# Patient Record
Sex: FEMALE | URBAN - METROPOLITAN AREA
[De-identification: names, ages, dates, MRNs, and addresses within clinical notes are randomized per-mention and may not be internally consistent; named-entity substitution may affect disease eponyms.]

---

## 2018-06-13 NOTE — PATIENT DISCUSSION
IRIS SYNECHIA OS   ;  IOP'S WNL AND IRIS STRUCTURES APPEAR STABLE. FOLLOW WITH SCHEDULED APPOINTMENTS.

## 2018-06-13 NOTE — PATIENT DISCUSSION
Posterior Synechiae Counseling: Most commonly formed during states of inflammation and cellular proliferation. Synechiae typically has an underlying inflammatory disease process such as uveitis and will present with related symptoms. The pathophysiology is thought to be related to inflammatory cells, fibrin and protein deposition, which stimulates the formation of adhesions between structures. A posterior pushing mechanism can cause apposition of the iris on the trabecular meshwork, which may result in continuous PAS and angle closure. Other causes include trauma and increased intraocular pressure. This has been fully explained to patient and the patient understands the importance of follow up exams.

## 2018-06-13 NOTE — PATIENT DISCUSSION
Iridodialyses Counseling : Iridodialyses is a separation of the iris from the scleral spur usually caused by blunt trauma to the eye, but may also be caused by penetrating eye injuries. Patient understands to call if any change in vision.

## 2018-12-12 NOTE — PATIENT DISCUSSION
Traumatic Glaucoma Counseling:  I have explained to the patient at length regarding the diagnosis of glaucoma its pathophysiology and it's potential for vision loss. I discussed the treatment options to include: medications, laser treatments, and surgery along with the risks and benefits of each. Follow-up as scheduled.

## 2018-12-12 NOTE — PATIENT DISCUSSION
New Prescription: latanoprost (latanoprost): drops: 0.005% 1 drop at bedtime as directed into left eye 12-

## 2018-12-12 NOTE — PATIENT DISCUSSION
TRAUMATIC GLAUCOMA OS: INTRAOCULAR PRESSURE IS NOT WITHIN ACCEPTABLE LIMITS OS WITH ANGLE RECESSION IN OS. START LATANOPROST QHS OS. STRESSED IMPORTANCE OF REGULAR EYE EXAMS AND CONSISTENT DROP USE. RETURN FOR FOLLOW-UP AS SCHEDULED.

## 2019-01-23 NOTE — PATIENT DISCUSSION
TRAUMATIC GLAUCOMA OS: INTRAOCULAR PRESSURE IS NOT WITHIN ACCEPTABLE LIMITS OS WITH ANGLE RECESSION IN OS. CONTINUE GLAUCOMA DROPS AS PRESCRIBED. STRESSED IMPORTANCE OF REGULAR EYE EXAMS AND CONSISTENT DROP USE. RETURN FOR FOLLOW-UP AS SCHEDULED.

## 2019-06-10 NOTE — PATIENT DISCUSSION
Secondary Glaucoma Counseling (Traumatic) OS: Explained to patient. Trauma is usually classified as blunt or penetrating ? both types can lead to secondary glaucoma. The presentation may be immediate at the time of the injury (acute or delayed), or develop over a period of time after the injury (chronic). Most chronic presentations are painless and slow in onset so patients at high risk of glaucoma should have regular lifelong monitoring of their intraocular pressure (IOP). Occasionally a later-onset form may present with sudden onset of pain and photophobia from raised IOP. Explained the importance of life long regular follow up care.

## 2019-06-10 NOTE — PATIENT DISCUSSION
TRAUMATIC GLAUCOMA OS: INTRAOCULAR PRESSURE IS WITHIN ACCEPTABLE LIMITS OS. CONTINUE GLAUCOMA DROPS AS PRESCRIBED. STRESSED IMPORTANCE OF REGULAR EYE EXAMS AND CONSISTENT DROP USE. RETURN FOR FOLLOW-UP AS SCHEDULED.

## 2019-11-04 NOTE — PATIENT DISCUSSION
Secondary Glaucoma Counseling (Traumatic) OS: Explained to patient. Trauma is usually classified as blunt or penetrating both types can lead to secondary glaucoma. The presentation may be immediate at the time of the injury (acute or delayed), or develop over a period of time after the injury (chronic). Most chronic presentations are painless and slow in onset so patients at high risk of glaucoma should have regular lifelong monitoring of their intraocular pressure (IOP). Occasionally a later-onset form may present with sudden onset of pain and photophobia from raised IOP. Explained the importance of life long regular follow up care.

## 2019-11-04 NOTE — PATIENT DISCUSSION
TRAUMATIC GLAUCOMA OS: INTRAOCULAR PRESSURE IS WITHIN ACCEPTABLE LIMITS OS . CONTINUE GLAUCOMA DROPS AS PRESCRIBED. STRESSED IMPORTANCE OF REGULAR EYE EXAMS AND CONSISTENT DROP USE. RETURN FOR FOLLOW-UP AS SCHEDULED.

## 2020-01-10 NOTE — PATIENT DISCUSSION
TRAUMATIC GLC OS: POSSIBLE CHANGE TO FIELD OS- REPEAT 3-6 MONTHS TO CONFIRM. CONTINUE LATANOPROST QHS -STRESSED COMPLIANCE OF EYE DROPS.

## 2020-07-29 ENCOUNTER — IMPORTED ENCOUNTER (OUTPATIENT)
Dept: URBAN - METROPOLITAN AREA CLINIC 9 | Facility: CLINIC | Age: 60
End: 2020-07-29

## 2020-10-07 NOTE — PATIENT DISCUSSION
Epiretinal Membrane Counseling: The diagnosis and natural history of epiretinal membrane was discussed with the patient including the risk of progression with retinal traction resulting in visual distortion. Patient instructed on how to do [id number] to check for distortion in vision, The patient is instructed to call back immediately if any vision changes, and keep follow up as scheduled.

## 2020-10-07 NOTE — PATIENT DISCUSSION
IRIS POSTERIOR SYNECHIA OS ; IOP'S WNL AND IRIS STRUCTURES APPEAR STABLE. FOLLOW WITH SCHEDULED APPOINTMENTS.

## 2020-10-07 NOTE — PATIENT DISCUSSION
TRAUMATIC GLAUCOMA OS: INTRAOCULAR PRESSURE IS WITHIN ACCEPTABLE LIMITS OS WITH STABLE OCT. CONTINUE GLAUCOMA DROPS AS PRESCRIBED. STRESSED IMPORTANCE OF REGULAR EYE EXAMS AND CONSISTENT DROP USE. RETURN FOR FOLLOW-UP AS SCHEDULED.

## 2020-10-07 NOTE — PATIENT DISCUSSION
POSTERIOR VITREOUS DETACHMENT WITH VITREOUS FLOATERS, OU: NO HOLES OR NO TEARS 360' WITH INDIRECT EXAM, OU. F&amp;F PRECAUTIONS REVIEWED WITH THE PATIENT. RETURN AS SCHEDULED.

## 2020-10-07 NOTE — PATIENT DISCUSSION
EPIRETINAL MEMBRANE, OS . MINIMAL VISUAL SIGNIFICANCE TO THE PATIENT AT THIS TIME. REFER TO DR Salvatore Mckeon FOR MONITORING.

## 2020-12-10 NOTE — PATIENT DISCUSSION
RETINA IS ATTACHED OU: VITREOUS SYNERESIS WITHOUT PVD OU; NO EPIRETINAL MEMBRANE OU; NO HOLES OR TEARS SEEN ON DILATED EXAM TODAY.  RETINAL DETACHMENT SIGNS AND SYMPTOMS REVIEWED

## 2020-12-10 NOTE — PATIENT DISCUSSION
CLEARED FOR PHACO OU. DISCUSSED GUARDED VISUAL PROGNOSIS AND POSSIBLE NEED FOR ADDITIONAL SURGERIES DUE TO HISTORY OF TRAUMA LEFT EYE.

## 2021-04-19 NOTE — PATIENT DISCUSSION
TRAUMATIC GLAUCOMA OS: INTRAOCULAR PRESSURE IS WITHIN ACCEPTABLE LIMITS WITH ANGLE RECESSION IN OS. CONTINUE GLAUCOMA DROPS AS PRESCRIBED. STRESSED IMPORTANCE OF CONSISTENT DROP USE. RETURN FOR FOLLOW-UP AS SCHEDULED.

## 2021-10-18 ASSESSMENT — VISUAL ACUITY
OS_CC: 20/100 - SN
OD_CC: 20/80 - SN
OD_CC: 20/100 - SN

## 2021-10-18 ASSESSMENT — TONOMETRY
OD_IOP_MMHG: 10
OS_IOP_MMHG: 8

## 2021-10-28 NOTE — PATIENT DISCUSSION
IOP is not within target range. Patient has been forgetting to take medication. Discontinue Latanoprost, substitute for Lumigan. If IOP is not below 20, will consider Combigan vs. SLT with Dr. Tio Luciano. RTC in 2 weeks for IOP Check, sooner if problems or changes.

## 2021-11-17 NOTE — PATIENT DISCUSSION
Explained to patient that when ready and time for cataract surgery, will consider transfer to Dr. Mikey Hannah for glaucoma & cataract evaluation.

## 2021-11-17 NOTE — PATIENT DISCUSSION
IOP has returned to acceptable range. Continue to use Lumigan. Patient states it is pricey. OK to return to Latanoprost when patient finishes bottle of Lumigan. If IOP is not below 20, will consider Combigan vs. SLT with Dr. Mikey Hannah.

## 2021-11-29 NOTE — PATIENT DISCUSSION
Explained to patient that when ready and time for cataract surgery, will consider transfer to Dr. Susie Grimes for glaucoma & cataract evaluation.

## 2021-11-29 NOTE — PATIENT DISCUSSION
HVF today WNL OD, WNL/non-specific changes OS. No changes in treatment indicated at this time. Continue latanoprost qhs OS only. Follow-up as directed. Monitor.

## 2022-01-27 NOTE — PATIENT DISCUSSION
Explained to patient that when ready and time for cataract surgery, will consider transfer to Dr. Grace Gutierrez for glaucoma & cataract evaluation.

## 2022-05-04 NOTE — PATIENT DISCUSSION
Excellent response to using the Latanoprost. Recommend  patient RTC in October for Visual Field and annual in November.

## 2022-06-30 RX ORDER — BUDESONIDE AND FORMOTEROL FUMARATE DIHYDRATE 80; 4.5 UG/1; UG/1
2 AEROSOL RESPIRATORY (INHALATION) 2 TIMES DAILY PRN
COMMUNITY
Start: 2021-06-24

## 2022-06-30 RX ORDER — AMOXICILLIN AND CLAVULANATE POTASSIUM 875; 125 MG/1; MG/1
TABLET, FILM COATED ORAL
COMMUNITY
End: 2022-07-21 | Stop reason: ALTCHOICE

## 2022-06-30 RX ORDER — BUSPIRONE HYDROCHLORIDE 15 MG/1
15 TABLET ORAL 3 TIMES DAILY PRN
COMMUNITY
End: 2022-09-13

## 2022-06-30 RX ORDER — AMLODIPINE BESYLATE 5 MG/1
TABLET ORAL
COMMUNITY
End: 2022-09-29 | Stop reason: DRUGHIGH

## 2022-06-30 RX ORDER — ATORVASTATIN CALCIUM 40 MG/1
80 TABLET, FILM COATED ORAL DAILY
COMMUNITY
End: 2022-07-21

## 2022-06-30 RX ORDER — CARVEDILOL 25 MG/1
TABLET ORAL
COMMUNITY

## 2022-07-08 RX ORDER — DULOXETIN HYDROCHLORIDE 60 MG/1
120 CAPSULE, DELAYED RELEASE ORAL DAILY
COMMUNITY

## 2022-07-08 RX ORDER — DOXAZOSIN 2 MG/1
TABLET ORAL
COMMUNITY
End: 2022-07-21

## 2022-07-09 RX ORDER — FLUCONAZOLE 150 MG/1
TABLET ORAL
COMMUNITY
End: 2022-07-21

## 2022-07-09 RX ORDER — ISOSORBIDE MONONITRATE 30 MG/1
TABLET, EXTENDED RELEASE ORAL
COMMUNITY
End: 2022-07-21

## 2022-07-09 RX ORDER — LAMOTRIGINE 100 MG/1
TABLET ORAL
COMMUNITY
End: 2022-07-21

## 2022-07-09 RX ORDER — FLUOXETINE HYDROCHLORIDE 40 MG/1
CAPSULE ORAL
COMMUNITY
End: 2022-07-21

## 2022-07-09 RX ORDER — LISINOPRIL 20 MG/1
40 TABLET ORAL DAILY
COMMUNITY
End: 2022-09-13

## 2022-07-09 RX ORDER — MELOXICAM 15 MG/1
TABLET ORAL
COMMUNITY
End: 2022-07-21

## 2022-07-09 RX ORDER — NITROGLYCERIN 0.4 MG/1
0.4 TABLET SUBLINGUAL EVERY 5 MIN PRN
COMMUNITY
End: 2022-09-13

## 2022-07-09 RX ORDER — ENALAPRIL MALEATE 20 MG/1
TABLET ORAL
COMMUNITY
End: 2022-07-21

## 2022-07-09 RX ORDER — LORAZEPAM 0.5 MG/1
0.5 TABLET ORAL 3 TIMES DAILY PRN
COMMUNITY
Start: 2022-04-19 | End: 2022-09-16 | Stop reason: SDUPTHER

## 2022-07-09 RX ORDER — LEVOTHYROXINE SODIUM 0.15 MG/1
150 TABLET ORAL DAILY
COMMUNITY

## 2022-07-09 RX ORDER — ONDANSETRON 4 MG/1
TABLET, FILM COATED ORAL
COMMUNITY
End: 2022-07-21

## 2022-07-09 RX ORDER — LAMOTRIGINE 200 MG/1
200 TABLET ORAL 2 TIMES DAILY
COMMUNITY

## 2022-07-09 RX ORDER — QUETIAPINE FUMARATE 100 MG/1
TABLET, FILM COATED ORAL
COMMUNITY
End: 2022-07-21

## 2022-07-09 RX ORDER — HYDROCODONE BITARTRATE AND ACETAMINOPHEN 5; 325 MG/1; MG/1
TABLET ORAL
COMMUNITY
End: 2022-07-21

## 2022-07-09 RX ORDER — HYDROXYZINE HYDROCHLORIDE 25 MG/1
TABLET, FILM COATED ORAL
COMMUNITY
End: 2022-07-21

## 2022-07-09 RX ORDER — PANTOPRAZOLE SODIUM 40 MG/1
40 TABLET, DELAYED RELEASE ORAL 2 TIMES DAILY
COMMUNITY
End: 2022-09-13 | Stop reason: SDUPTHER

## 2022-07-09 RX ORDER — PRAVASTATIN SODIUM 80 MG/1
TABLET ORAL
COMMUNITY
End: 2022-07-21

## 2022-07-09 RX ORDER — ISOSORBIDE MONONITRATE 120 MG/1
120 TABLET, EXTENDED RELEASE ORAL DAILY
COMMUNITY
End: 2022-09-13

## 2022-07-09 RX ORDER — KETOCONAZOLE 20 MG/G
CREAM TOPICAL
COMMUNITY
End: 2022-07-21

## 2022-07-09 RX ORDER — OMEPRAZOLE 40 MG/1
40 CAPSULE, DELAYED RELEASE ORAL 2 TIMES DAILY
COMMUNITY
End: 2022-07-21

## 2022-07-11 RX ORDER — TRAMADOL HYDROCHLORIDE 50 MG/1
50 TABLET ORAL 2 TIMES DAILY PRN
COMMUNITY

## 2022-07-20 PROBLEM — K85.90 ACUTE PANCREATITIS WITHOUT INFECTION OR NECROSIS: Status: ACTIVE | Noted: 2022-07-20

## 2022-07-20 PROBLEM — G47.39 OTHER SLEEP APNEA: Status: ACTIVE | Noted: 2022-07-20

## 2022-07-20 PROBLEM — E03.9 ACQUIRED HYPOTHYROIDISM: Status: ACTIVE | Noted: 2022-07-20

## 2022-07-20 PROBLEM — U07.1 COVID-19: Status: ACTIVE | Noted: 2022-07-20

## 2022-07-20 PROBLEM — M75.111 INCOMPLETE TEAR OF RIGHT ROTATOR CUFF: Status: ACTIVE | Noted: 2022-07-20

## 2022-07-20 PROBLEM — E11.65 TYPE 2 DIABETES MELLITUS WITH HYPERGLYCEMIA, WITHOUT LONG-TERM CURRENT USE OF INSULIN (HCC): Status: ACTIVE | Noted: 2022-07-20

## 2022-07-20 PROBLEM — E55.9 VITAMIN D DEFICIENCY: Status: ACTIVE | Noted: 2022-07-20

## 2022-07-20 PROBLEM — M47.816 LUMBAR SPONDYLOSIS: Status: ACTIVE | Noted: 2022-07-20

## 2022-07-20 PROBLEM — I10 ESSENTIAL HYPERTENSION: Status: ACTIVE | Noted: 2022-07-20

## 2022-07-20 PROBLEM — D64.9 ANEMIA: Status: ACTIVE | Noted: 2022-07-20

## 2022-07-20 PROBLEM — R10.9 RIGHT FLANK PAIN: Status: ACTIVE | Noted: 2022-07-20

## 2022-07-20 PROBLEM — R35.0 URINARY FREQUENCY: Status: ACTIVE | Noted: 2022-07-20

## 2022-07-20 PROBLEM — J45.20 MILD INTERMITTENT ASTHMA WITHOUT COMPLICATION: Status: ACTIVE | Noted: 2022-07-20

## 2022-07-20 PROBLEM — Z96.60 HISTORY OF ARTIFICIAL JOINT: Status: ACTIVE | Noted: 2022-07-20

## 2022-07-20 PROBLEM — F31.9 BIPOLAR 1 DISORDER (HCC): Status: ACTIVE | Noted: 2022-07-20

## 2022-07-20 PROBLEM — R91.8 PULMONARY NODULES: Status: ACTIVE | Noted: 2022-07-20

## 2022-07-20 PROBLEM — N20.0 NEPHROLITHIASIS: Status: ACTIVE | Noted: 2022-07-20

## 2022-07-20 PROBLEM — M17.12 PRIMARY OSTEOARTHRITIS OF LEFT KNEE: Status: ACTIVE | Noted: 2022-07-20

## 2022-07-20 PROBLEM — Z96.659 ARTIFICIAL KNEE JOINT PRESENT: Status: ACTIVE | Noted: 2022-07-20

## 2022-07-20 PROBLEM — I25.119 CORONARY ARTERY DISEASE INVOLVING NATIVE CORONARY ARTERY OF NATIVE HEART WITH ANGINA PECTORIS (HCC): Status: ACTIVE | Noted: 2022-07-20

## 2022-07-20 PROBLEM — E78.5 HYPERLIPIDEMIA LDL GOAL <100: Status: ACTIVE | Noted: 2022-07-20

## 2022-07-21 PROBLEM — U07.1 COVID-19: Status: RESOLVED | Noted: 2022-07-20 | Resolved: 2022-07-21

## 2022-07-21 PROBLEM — K85.90 ACUTE PANCREATITIS WITHOUT INFECTION OR NECROSIS: Status: RESOLVED | Noted: 2022-07-20 | Resolved: 2022-07-21

## 2022-07-21 PROBLEM — M75.111 INCOMPLETE TEAR OF RIGHT ROTATOR CUFF: Status: RESOLVED | Noted: 2022-07-20 | Resolved: 2022-07-21

## 2022-07-21 PROBLEM — R10.9 RIGHT FLANK PAIN: Status: RESOLVED | Noted: 2022-07-20 | Resolved: 2022-07-21

## 2022-09-13 PROBLEM — N18.32 STAGE 3B CHRONIC KIDNEY DISEASE (HCC): Status: ACTIVE | Noted: 2022-09-13

## 2022-10-17 NOTE — PATIENT DISCUSSION
HVF today WNL OD, stable defect/points OS. No changes in treatment indicated at this time. Continue latanoprost qhs OS only. Follow-up as directed. Monitor.

## 2022-11-14 NOTE — PATIENT DISCUSSION
Retinal tear and detachment warning symptoms reviewed and patient instructed to call immediately if increasing floaters, flashes, or decreasing peripheral vision. 2+ (normal)